# Patient Record
Sex: MALE | Race: WHITE | NOT HISPANIC OR LATINO | Employment: UNEMPLOYED | URBAN - METROPOLITAN AREA
[De-identification: names, ages, dates, MRNs, and addresses within clinical notes are randomized per-mention and may not be internally consistent; named-entity substitution may affect disease eponyms.]

---

## 2021-10-17 ENCOUNTER — APPOINTMENT (OUTPATIENT)
Dept: GENERAL RADIOLOGY | Facility: CLINIC | Age: 61
End: 2021-10-17
Attending: EMERGENCY MEDICINE
Payer: COMMERCIAL

## 2021-10-17 ENCOUNTER — HOSPITAL ENCOUNTER (OUTPATIENT)
Facility: CLINIC | Age: 61
Setting detail: OBSERVATION
Discharge: HOME | End: 2021-10-18
Attending: EMERGENCY MEDICINE | Admitting: INTERNAL MEDICINE
Payer: COMMERCIAL

## 2021-10-17 DIAGNOSIS — I20.0 UNSTABLE ANGINA (H): ICD-10-CM

## 2021-10-17 PROBLEM — R07.9 CHEST PAIN: Status: ACTIVE | Noted: 2021-10-17

## 2021-10-17 LAB
ALBUMIN SERPL-MCNC: 3.5 G/DL (ref 3.4–5)
ALP SERPL-CCNC: 90 U/L (ref 40–150)
ALT SERPL W P-5'-P-CCNC: 26 U/L (ref 0–70)
ANION GAP SERPL CALCULATED.3IONS-SCNC: 5 MMOL/L (ref 3–14)
APTT PPP: 32 SECONDS (ref 22–38)
AST SERPL W P-5'-P-CCNC: 21 U/L (ref 0–45)
BASOPHILS # BLD AUTO: 0.2 10E3/UL (ref 0–0.2)
BASOPHILS NFR BLD AUTO: 2 %
BILIRUB SERPL-MCNC: 0.6 MG/DL (ref 0.2–1.3)
BUN SERPL-MCNC: 19 MG/DL (ref 7–30)
CALCIUM SERPL-MCNC: 8.5 MG/DL (ref 8.5–10.1)
CHLORIDE BLD-SCNC: 107 MMOL/L (ref 94–109)
CO2 SERPL-SCNC: 27 MMOL/L (ref 20–32)
CREAT SERPL-MCNC: 0.99 MG/DL (ref 0.66–1.25)
EOSINOPHIL # BLD AUTO: 0.2 10E3/UL (ref 0–0.7)
EOSINOPHIL NFR BLD AUTO: 2 %
ERYTHROCYTE [DISTWIDTH] IN BLOOD BY AUTOMATED COUNT: 14.4 % (ref 10–15)
ERYTHROCYTE [DISTWIDTH] IN BLOOD BY AUTOMATED COUNT: 14.6 % (ref 10–15)
GFR SERPL CREATININE-BSD FRML MDRD: 83 ML/MIN/1.73M2
GLUCOSE BLD-MCNC: 97 MG/DL (ref 70–99)
HCT VFR BLD AUTO: 46.8 % (ref 40–53)
HCT VFR BLD AUTO: 49.5 % (ref 40–53)
HGB BLD-MCNC: 15.4 G/DL (ref 13.3–17.7)
HGB BLD-MCNC: 16.4 G/DL (ref 13.3–17.7)
HOLD SPECIMEN: NORMAL
IMM GRANULOCYTES # BLD: 0 10E3/UL
IMM GRANULOCYTES NFR BLD: 0 %
INR PPP: 1.05 (ref 0.85–1.15)
LYMPHOCYTES # BLD AUTO: 2.9 10E3/UL (ref 0.8–5.3)
LYMPHOCYTES NFR BLD AUTO: 30 %
MCH RBC QN AUTO: 29.3 PG (ref 26.5–33)
MCH RBC QN AUTO: 30.5 PG (ref 26.5–33)
MCHC RBC AUTO-ENTMCNC: 32.9 G/DL (ref 31.5–36.5)
MCHC RBC AUTO-ENTMCNC: 33.1 G/DL (ref 31.5–36.5)
MCV RBC AUTO: 89 FL (ref 78–100)
MCV RBC AUTO: 92 FL (ref 78–100)
MONOCYTES # BLD AUTO: 0.8 10E3/UL (ref 0–1.3)
MONOCYTES NFR BLD AUTO: 8 %
NEUTROPHILS # BLD AUTO: 5.8 10E3/UL (ref 1.6–8.3)
NEUTROPHILS NFR BLD AUTO: 58 %
NRBC # BLD AUTO: 0 10E3/UL
NRBC BLD AUTO-RTO: 0 /100
PLATELET # BLD AUTO: 553 10E3/UL (ref 150–450)
PLATELET # BLD AUTO: 568 10E3/UL (ref 150–450)
POTASSIUM BLD-SCNC: 4.2 MMOL/L (ref 3.4–5.3)
PROT SERPL-MCNC: 7.6 G/DL (ref 6.8–8.8)
RBC # BLD AUTO: 5.25 10E6/UL (ref 4.4–5.9)
RBC # BLD AUTO: 5.38 10E6/UL (ref 4.4–5.9)
SARS-COV-2 RNA RESP QL NAA+PROBE: NEGATIVE
SODIUM SERPL-SCNC: 139 MMOL/L (ref 133–144)
TROPONIN I SERPL-MCNC: <0.015 UG/L (ref 0–0.04)
WBC # BLD AUTO: 13.7 10E3/UL (ref 4–11)
WBC # BLD AUTO: 9.9 10E3/UL (ref 4–11)

## 2021-10-17 PROCEDURE — 85025 COMPLETE CBC W/AUTO DIFF WBC: CPT | Performed by: EMERGENCY MEDICINE

## 2021-10-17 PROCEDURE — 82040 ASSAY OF SERUM ALBUMIN: CPT | Performed by: EMERGENCY MEDICINE

## 2021-10-17 PROCEDURE — 96376 TX/PRO/DX INJ SAME DRUG ADON: CPT

## 2021-10-17 PROCEDURE — 36415 COLL VENOUS BLD VENIPUNCTURE: CPT | Performed by: EMERGENCY MEDICINE

## 2021-10-17 PROCEDURE — 96374 THER/PROPH/DIAG INJ IV PUSH: CPT

## 2021-10-17 PROCEDURE — 87635 SARS-COV-2 COVID-19 AMP PRB: CPT | Performed by: EMERGENCY MEDICINE

## 2021-10-17 PROCEDURE — 36415 COLL VENOUS BLD VENIPUNCTURE: CPT | Performed by: INTERNAL MEDICINE

## 2021-10-17 PROCEDURE — 250N000013 HC RX MED GY IP 250 OP 250 PS 637: Performed by: INTERNAL MEDICINE

## 2021-10-17 PROCEDURE — 250N000011 HC RX IP 250 OP 636: Performed by: EMERGENCY MEDICINE

## 2021-10-17 PROCEDURE — 71046 X-RAY EXAM CHEST 2 VIEWS: CPT

## 2021-10-17 PROCEDURE — 93005 ELECTROCARDIOGRAM TRACING: CPT

## 2021-10-17 PROCEDURE — C9803 HOPD COVID-19 SPEC COLLECT: HCPCS

## 2021-10-17 PROCEDURE — 84484 ASSAY OF TROPONIN QUANT: CPT | Performed by: EMERGENCY MEDICINE

## 2021-10-17 PROCEDURE — 84484 ASSAY OF TROPONIN QUANT: CPT | Performed by: INTERNAL MEDICINE

## 2021-10-17 PROCEDURE — 99285 EMERGENCY DEPT VISIT HI MDM: CPT | Mod: 25

## 2021-10-17 PROCEDURE — 250N000013 HC RX MED GY IP 250 OP 250 PS 637: Performed by: EMERGENCY MEDICINE

## 2021-10-17 PROCEDURE — 99220 PR INITIAL OBSERVATION CARE,LEVEL III: CPT | Performed by: INTERNAL MEDICINE

## 2021-10-17 PROCEDURE — 85610 PROTHROMBIN TIME: CPT | Performed by: EMERGENCY MEDICINE

## 2021-10-17 PROCEDURE — 85027 COMPLETE CBC AUTOMATED: CPT | Performed by: INTERNAL MEDICINE

## 2021-10-17 PROCEDURE — G0378 HOSPITAL OBSERVATION PER HR: HCPCS

## 2021-10-17 PROCEDURE — 85730 THROMBOPLASTIN TIME PARTIAL: CPT | Performed by: EMERGENCY MEDICINE

## 2021-10-17 RX ORDER — ACETAMINOPHEN 325 MG/1
975 TABLET ORAL EVERY 8 HOURS
Status: DISCONTINUED | OUTPATIENT
Start: 2021-10-17 | End: 2021-10-18 | Stop reason: HOSPADM

## 2021-10-17 RX ORDER — LOSARTAN POTASSIUM 50 MG/1
100 TABLET ORAL DAILY
Status: DISCONTINUED | OUTPATIENT
Start: 2021-10-17 | End: 2021-10-18 | Stop reason: HOSPADM

## 2021-10-17 RX ORDER — ONDANSETRON 2 MG/ML
4 INJECTION INTRAMUSCULAR; INTRAVENOUS EVERY 6 HOURS PRN
Status: DISCONTINUED | OUTPATIENT
Start: 2021-10-17 | End: 2021-10-18 | Stop reason: HOSPADM

## 2021-10-17 RX ORDER — AMOXICILLIN 250 MG
1 CAPSULE ORAL 2 TIMES DAILY PRN
Status: DISCONTINUED | OUTPATIENT
Start: 2021-10-17 | End: 2021-10-18 | Stop reason: HOSPADM

## 2021-10-17 RX ORDER — HEPARIN SODIUM 10000 [USP'U]/100ML
0-5000 INJECTION, SOLUTION INTRAVENOUS CONTINUOUS
Status: DISCONTINUED | OUTPATIENT
Start: 2021-10-17 | End: 2021-10-17

## 2021-10-17 RX ORDER — METOPROLOL TARTRATE 25 MG/1
25 TABLET, FILM COATED ORAL 2 TIMES DAILY
Status: DISCONTINUED | OUTPATIENT
Start: 2021-10-17 | End: 2021-10-18 | Stop reason: HOSPADM

## 2021-10-17 RX ORDER — AMOXICILLIN 250 MG
2 CAPSULE ORAL 2 TIMES DAILY PRN
Status: DISCONTINUED | OUTPATIENT
Start: 2021-10-17 | End: 2021-10-18 | Stop reason: HOSPADM

## 2021-10-17 RX ORDER — ONDANSETRON 4 MG/1
4 TABLET, ORALLY DISINTEGRATING ORAL EVERY 6 HOURS PRN
Status: DISCONTINUED | OUTPATIENT
Start: 2021-10-17 | End: 2021-10-18 | Stop reason: HOSPADM

## 2021-10-17 RX ORDER — ASPIRIN 81 MG/1
324 TABLET, CHEWABLE ORAL ONCE
Status: COMPLETED | OUTPATIENT
Start: 2021-10-17 | End: 2021-10-17

## 2021-10-17 RX ORDER — ASPIRIN 325 MG
325 TABLET ORAL DAILY
Status: DISCONTINUED | OUTPATIENT
Start: 2021-10-18 | End: 2021-10-18 | Stop reason: HOSPADM

## 2021-10-17 RX ORDER — LOSARTAN POTASSIUM 100 MG/1
100 TABLET ORAL DAILY
COMMUNITY

## 2021-10-17 RX ORDER — LEVOTHYROXINE SODIUM 112 UG/1
112 TABLET ORAL DAILY
COMMUNITY

## 2021-10-17 RX ORDER — LEVOTHYROXINE SODIUM 112 UG/1
112 TABLET ORAL DAILY
Status: DISCONTINUED | OUTPATIENT
Start: 2021-10-17 | End: 2021-10-18 | Stop reason: HOSPADM

## 2021-10-17 RX ORDER — METOPROLOL TARTRATE 25 MG/1
25 TABLET, FILM COATED ORAL 2 TIMES DAILY
COMMUNITY

## 2021-10-17 RX ORDER — LIDOCAINE 40 MG/G
CREAM TOPICAL
Status: DISCONTINUED | OUTPATIENT
Start: 2021-10-17 | End: 2021-10-18 | Stop reason: HOSPADM

## 2021-10-17 RX ADMIN — LOSARTAN POTASSIUM 100 MG: 50 TABLET, FILM COATED ORAL at 16:02

## 2021-10-17 RX ADMIN — HEPARIN SODIUM 1200 UNITS/HR: 10000 INJECTION, SOLUTION INTRAVENOUS at 12:40

## 2021-10-17 RX ADMIN — ACETAMINOPHEN 975 MG: 325 TABLET, FILM COATED ORAL at 16:02

## 2021-10-17 RX ADMIN — LEVOTHYROXINE SODIUM 112 MCG: 0.11 TABLET ORAL at 16:02

## 2021-10-17 RX ADMIN — ASPIRIN 81 MG CHEWABLE TABLET 324 MG: 81 TABLET CHEWABLE at 12:37

## 2021-10-17 ASSESSMENT — ACTIVITIES OF DAILY LIVING (ADL): ADLS_ACUITY_SCORE: 12

## 2021-10-17 ASSESSMENT — MIFFLIN-ST. JEOR: SCORE: 1936.64

## 2021-10-17 NOTE — PHARMACY-ADMISSION MEDICATION HISTORY
Admission medication history interview status for this patient is complete. See [facility] admission navigator for allergy information, prior to admission medications and immunization status.     Medication history interview done, indicate source(s): Patient  Medication history resources (including written lists, pill bottles, clinic record): [facility]  Pharmacy: [facility] off of [facility]    Changes made to PTA medication list:  Added: All medications listed below were added  Changed: None  Reported as Not Taking: None  Removed: None    Actions taken by pharmacist (provider contacted, etc):None     Additional medication history information:None    Medication reconciliation/reorder completed by provider prior to medication history?  No     Prior to Admission medications    Medication Sig Last Dose Taking? Auth Provider   levothyroxine (SYNTHROID/LEVOTHROID) 112 MCG tablet Take 112 mcg by mouth daily 10/16/2021 at Unknown time Yes Unknown, Entered By History   losartan (COZAAR) 100 MG tablet Take 100 mg by mouth daily 10/16/2021 at Unknown time Yes Unknown, Entered By History   metoprolol tartrate (LOPRESSOR) 25 MG tablet Take 25 mg by mouth 2 times daily 10/16/2021 at Unknown time Yes Unknown, Entered By History

## 2021-10-17 NOTE — ED TRIAGE NOTES
Pt arrives to the ED due to left sided chest pain that has been present since yesterday morning while mowing grass. Pt states the pain has been intermittent but that he has had 10 episodes since and each time he feels dizzy/lightheaded and feels like he will pass out. Denies SOB or vomiting.

## 2021-10-17 NOTE — H&P
See dictated H&P    Second troponin undetectable.  Will stop heparin gtt (started in ER).      Admit to observation     Plan on stress echo in AM

## 2021-10-17 NOTE — H&P
Admitted: 10/17/2021    CHIEF COMPLAINT:  Chest pressure.    HISTORY OF PRESENT ILLNESS:  Mr. Prinsen and is a 61-year-old male with a history of hypertension, hyperlipidemia, hypothyroidism and irregular heartbeat.  He presented to the hospital today for concerns about chest pressure.  He developed chest pressure yesterday.  He has had intermittent chest pressure for the past 24 hours.  When I asked him where his chest pressure is located, he points to the left side of his chest near his left nipple line.  He reports the pain is localized and does not radiate to the back, arm, neck or jaw.  He has no associated shortness of breath, nausea or palpitations.  Beyond chest pressure, he has also been feeling quite a faint, feeling like he has almost passed out several times.  The patient has never had symptoms like this in the past.  His chest pressure symptoms started yesterday while using a push lawnmower.  Since that time, he has had at least 20-30 episodes of recurrent chest pressure.  Episodes last for about 20-30 seconds and then spontaneously resolve.  They are happening with activity and at rest.  He had several such occurrences in the Emergency Department since arriving to [facility].    On arrival to the ER today, vital signs included blood pressure 160/115 with a heart rate of 80.  No fever.  Saturation 97% on room air.  Workup in the ER included labs and imaging.  EKG was performed, which I personally reviewed and shows a sinus rhythm with no acute ST changes or pathologic Q-waves.  EKG appears normal.  CBC is unremarkable.  Complete metabolic panel is normal.  Troponin is undetectable.  COVID-19 testing is negative.  I have ordered a second troponin level, which has been collected and is pending.  Chest x-ray showed no acute findings.    I spoke with Dr. Guido of the Emergency Department.  In the ER, the patient was started on a heparin drip for concerns about possible acute coronary syndrome.  He is being  admitted to the hospitalist service.    PAST MEDICAL HISTORY:     1.  Hypertension.  2.  Hyperlipidemia.  3.  Hypothyroidism.  4.  History of irregular heartbeat, further details unclear.    CURRENT MEDICATIONS:     1.  Levothyroxine.  2.  Losartan.  3.  Metoprolol.    ALLERGIES:  NONE.    FAMILY HISTORY:  Reviewed.  Nothing contributory to this admission.  Specifically, no known heart disease in the family.    SOCIAL HISTORY:  Has never been a smoker of cigarettes.  He does chew tobacco.  He does not drink any significant alcohol.  Spouse is present with him at bedside.    REVIEW OF SYSTEMS:  See HPI for details.  Comprehensive greater than 10-point review of systems is otherwise negative besides that detailed above.    PHYSICAL EXAMINATION:    VITAL SIGNS:  Blood pressure is currently 155/83 with a heart rate of 68.  No fever.  Saturation 96% on room air.  GENERAL:  The patient appears nontoxic.  He does not appear to be in any distress.  His spouse is present at bedside.  He is a good historian.  HEENT:  Head is atraumatic.  Sclerae white.  Eyelids normal.  Conjunctivae normal.  Extraocular movements are intact.  NECK:  Supple.  No cervical or supraclavicular lymphadenopathy.  CARDIOVASCULAR:  Heart is regular rate and rhythm.  No significant murmurs.  No lower extremity edema.  LUNGS:  Clear to auscultation bilaterally.  No intercostal retractions.  No conversational dyspnea.  ABDOMEN:  Nontender, soft, no masses, no organomegaly.  EXTREMITIES:  Show no edema.  SKIN:  Reveals no rashes.  No jaundice.  Skin is dry to touch.  NEUROLOGIC:  Cranial nerves II through XII are intact.  Moves all extremities appropriately.  Sensation intact to light touch in the upper and lower extremities bilaterally.  PSYCHIATRIC:  The patient is awake, alert and oriented x3.  Mood and affect are normal and appropriate.  MUSCULOSKELETAL:  Patient has no tenderness with palpation to the chest wall.  VASCULAR:  Patient a radial artery  pulses appear to be equal in bilateral upper extremities.    LABORATORY AND IMAGING DATA:  Reviewed above in HPI.  EKG personally reviewed as above.    IMPRESSION AND PLAN:  Mr. Khanna and is a 61-year-old male with a history of hypertension, hypothyroidism, hyperlipidemia.  He presents to the hospital today with chest pressure.  He reports that he has had multiple episodes of chest pressure over the past 24 hours.  They are occurring with activity and at rest.  Symptoms lasted for about 20-30 seconds and then spontaneously resolved.  Associated symptoms include lightheadedness and feelings of near syncope.    Objectively, there was no evidence of cardiac ischemia currently.  He has a normal EKG.  He has a normal troponin level after having symptoms for the past 24 hours.    1.  Chest pressure colon symptoms sound atypical.  Symptoms last only 20-30 seconds and resolve.  Symptoms are located in the area of left breast do not radiate elsewhere.  EKG normal and troponin enzyme negative.  2.  Hypertension history:  Resume home medications when clarified by Pharmacy.  Would hold beta blocker for now, given plans for likely exercise stress test tomorrow.  3.  Hyperlipidemia:  Currently untreated.  We will check lipid panel in the morning.  4.  Hypothyroidism:  Will check TSH.  Continue home dose of levothyroxine.    The patient currently on a heparin drip, which was started in the Emergency Department.  If his next troponin level is again undetectable and not rising, I will stop the heparin drip.  At this point, observation status appears to be appropriate.  We will order a stress echocardiogram for tomorrow.  If, however, troponins become elevated, would cancel stress echocardiogram and involve Cardiology.    Colin Carreon MD        D: 10/17/2021   T: 10/17/2021   MT: [id number]    Name:     ASHLEE KHANNA  MRN:      [id number]        Account:     [id number]   :      1960           Admitted:    10/17/2021        Document: [id number]

## 2021-10-17 NOTE — ED PROVIDER NOTES
History     Chief Complaint:  Chest Pain       HPI   Henry Khanna is a 61 year old male who presents with stuttering chest pain.  The patient states that yesterday when he was mowing he began to experience left-sided chest discomfort that was associated with simultaneous lightheadedness.  He has had approximately 12 episodes of the symptoms since however they are not necessarily always exertional.  He has no exacerbating or alleviating identified factors.  He does not have any shortness of breath or palpitations with them.  Denies, prior to yesterday, ever having similar symptoms.  He has not taken any aspirin today.    Allergies:  No Known Allergies     Medications:    Metoprolol  Levothyroxine  Losartan    Past Medical History:    Hypertension  Hypothyroidism,  High cholesterol    Past Surgical History:    Splenectomy  Tonsillectomy    Family History:    family history is not on file.    Social History:   Arrives by private car    PCP: No primary care provider on file.     Review of Systems  Positive-chest pain, lightheadedness  Negative-shortness of breath, palpitations  Remaining pertinent 10 point ROS negative    Physical Exam     Patient Vitals for the past 24 hrs:   BP Temp Pulse Resp SpO2 Weight   10/17/21 1330 -- -- 62 25 95 % --   10/17/21 1315 124/81 -- 60 23 95 % --   10/17/21 1300 (!) 132/99 -- 63 13 96 % --   10/17/21 1200 (!) 149/92 -- 73 -- 96 % --   10/17/21 1137 (!) 160/115 98.6  F (37  C) 78 20 97 % 108.8 kg (239 lb 13.8 oz)        Physical Exam  General/Appearance: appears stated age, well-groomed, appears comfortable  Eyes: EOMI, no scleral injection, no icterus  ENT: MMM  Neck: supple, nl ROM, no stiffness  Cardiovascular: RRR, nl S1S2, no m/r/g, 2+ pulses in all 4 extremities, cap refill <2sec  Respiratory: CTAB, good air movement throughout, no wheezes/rhonchi/rales, no increased WOB, no retractions  GI: abd soft, non-distended, nttp,  no HSM, no rebound, no guarding, nl BS  MSK: ALMANZAR, good  tone, no bony abnormality  Skin: warm and well-perfused, no rash, no edema, no ecchymosis, nl turgor  Neuro: GCS 15, alert and oriented, no gross focal neuro deficits  Psych: interacts appropriately  Heme: no petechia, no purpura, no active bleeding      Emergency Department Course     ECG (11:33:58):  NSR  Rate 82 bpm.   QT/QTc 450.   P-R-T axes 49 16 29.   Interpreted at 1205 by Malathi Guido.     Imaging:  XR Chest 2 Views   Final Result   IMPRESSION: Negative chest.         All imaging results were discussed with the pt and his wife who voiced understanding of the findings.    Laboratory:  BMP-139, 4.2, 107, 27, 19, 0.99, 83  Glucose 97  Total protein 7.6  Total bilirubin 0.6  Alk phos 90  ALT 26  AST 21  CBC-9.9, 16.4, 49.5, 568  Troponin less than 0.015  INR 1.05  PTT 32    Interventions:   mg  Heparin bolus and gtt    Emergency Department Course:  Past medical records, nursing notes, and vitals reviewed.  I performed an exam of the patient and obtained history, as documented above.  ED Course as of Oct 17 1338   Sun Oct 17, 2021   1315 Updated on results      1327 Spoke with Dr Carreon          Impression & Plan        Medical Decision Making:  This pt presents with chest pain of unclear etiology, however, I have concern that it represent a cardiac etiology.  Their EKG, CXR, and trop have been unremarkable however I do feel the warrant admission to further workup for unstable angina with further EKGs, trops, and provocative testing. There are no focal infiltrates, effusions, pulm edema, or evidence of PTX on CXR. The pt has not had recent fevers or viral infections to suggest pericarditis.  They are at low risk for aortic pathology and have nl aortic contour on CXR.  They have not had recent chest trauma.  All of this was discussed with the pt and they have agreed to come in for further work-up.  So far they have received ASA and heparin gtt -- they currently are pain free.  At the time of  their transfer out of the ED they are HD stable.    Diagnosis:    ICD-10-CM    1. Unstable angina (H)  I20.0         Discharge Medications:  New Prescriptions    No medications on file        10/17/2021   Malathi Guido*        Malathi Guido MD  10/17/21 5424

## 2021-10-17 NOTE — ED NOTES
[facility]  ED Nurse Handoff Report    Henry Khanna is a 61 year old male   ED Chief complaint: Chest Pain  . ED Diagnosis:   Final diagnoses:   Unstable angina (H)     Allergies: No Known Allergies    Code Status: Full Code  Activity level - Baseline/Home:  Independent. Activity Level - Current:   Independent. Lift room needed: No. Bariatric: No   Needed: No   Isolation: No. Infection: Not Applicable.     Vital Signs:   Vitals:    10/17/21 1200 10/17/21 1300 10/17/21 1315 10/17/21 1330   BP: (!) 149/92 (!) 132/99 124/81    Pulse: 73 63 60 62   Resp:  13 23 25   Temp:       SpO2: 96% 96% 95% 95%   Weight:           Cardiac Rhythm:  ,    SR  Pain level:    Patient confused: No. Patient Falls Risk: No.   Elimination Status: has not voided yet   Patient Report - Initial Complaint:   Intermittent left-sided chest discomfort with lightheadedness. About 12 episodes since yesterday. Focused Assessment: Currently pain free  Tests Performed: imaging and labs. Abnormal Results:  Labs Ordered and Resulted from Time of ED Arrival Up to the Time of Departure from the ED   CBC WITH PLATELETS AND DIFFERENTIAL - Abnormal; Notable for the following components:       Result Value    Platelet Count 568 (*)     All other components within normal limits   COMPREHENSIVE METABOLIC PANEL - Normal   INR - Normal   PARTIAL THROMBOPLASTIN TIME - Normal   TROPONIN I - Normal   EXTRA RED TOP TUBE   EXTRA GREEN TOP (LITHIUM HEPARIN) TUBE   EXTRA PURPLE TOP TUBE   EXTRA BLUE TOP TUBE   COVID-19 VIRUS (CORONAVIRUS) BY PCR   CARDIAC CONTINUOUS MONITORING   PERIPHERAL IV CATHETER   MEASURE WEIGHT   NOTIFY PHYSICIAN   NOTIFY PHYSICIAN   EXTRA TUBE    Narrative:     The following orders were created for panel order Extra Tube ([place] Draw).  Procedure                               Abnormality         Status                     ---------                               -----------         ------                     Extra Red  Top Tube[[id number]]                               Final result               Extra Green Top (Lithium...[[id number]]                      Final result               Extra Purple Top Tube[[id number]]                            Final result                 Please view results for these tests on the individual orders.   EXTRA TUBE    Narrative:     The following orders were created for panel order Extra Tube ([place] Draw).  Procedure                               Abnormality         Status                     ---------                               -----------         ------                     Extra Blue Top Tube[[id number]]                              Final result                 Please view results for these tests on the individual orders.   CBC WITH PLATELETS & DIFFERENTIAL    Narrative:     The following orders were created for panel order CBC with platelets differential.  Procedure                               Abnormality         Status                     ---------                               -----------         ------                     CBC with platelets and d...[[id number]]  Abnormal            Final result                 Please view results for these tests on the individual orders.     OBS brochure/video discussed/provided to patient:  Yes  ED Medications:   Medications   heparin 25,000 units in 0.45% NaCl 250 mL ANTICOAGULANT infusion (1,200 Units/hr Intravenous New Bag 10/17/21 1240)   heparin ANTICOAGULANT loading dose for  LOW INTENSITY TREATMENT* Give BEFORE starting heparin infusion (6,000 Units Intravenous Given 10/17/21 1238)   aspirin (ASA) chewable tablet 324 mg (324 mg Oral Given 10/17/21 1237)     Drips infusing:  Yes, heparin  For the majority of the shift, the patient's behavior Green.     Sepsis treatment initiated: No     Patient tested for COVID 19 prior to admission: YES    ED Nurse Name/Phone Number: Monika Howard RN,   1:50 PM   RECEIVING UNIT ED HANDOFF REVIEW    Above ED Nurse  Handoff Report was reviewed: Yes  Reviewed by: Mary Jo Severance, RN on October 17, 2021 at 2:00 PM

## 2021-10-18 ENCOUNTER — APPOINTMENT (OUTPATIENT)
Dept: CARDIOLOGY | Facility: CLINIC | Age: 61
End: 2021-10-18
Attending: INTERNAL MEDICINE
Payer: COMMERCIAL

## 2021-10-18 VITALS
HEART RATE: 82 BPM | DIASTOLIC BLOOD PRESSURE: 66 MMHG | SYSTOLIC BLOOD PRESSURE: 115 MMHG | OXYGEN SATURATION: 93 % | WEIGHT: 238.9 LBS | TEMPERATURE: 99 F | RESPIRATION RATE: 18 BRPM | HEIGHT: 72 IN | BODY MASS INDEX: 32.36 KG/M2

## 2021-10-18 LAB
ATRIAL RATE - MUSE: 82 BPM
CHOLEST SERPL-MCNC: 195 MG/DL
DIASTOLIC BLOOD PRESSURE - MUSE: NORMAL MMHG
FASTING STATUS PATIENT QL REPORTED: ABNORMAL
HDLC SERPL-MCNC: 48 MG/DL
INTERPRETATION ECG - MUSE: NORMAL
LDLC SERPL CALC-MCNC: 126 MG/DL
NONHDLC SERPL-MCNC: 147 MG/DL
P AXIS - MUSE: 49 DEGREES
PR INTERVAL - MUSE: 164 MS
QRS DURATION - MUSE: 84 MS
QT - MUSE: 386 MS
QTC - MUSE: 450 MS
R AXIS - MUSE: 16 DEGREES
SYSTOLIC BLOOD PRESSURE - MUSE: NORMAL MMHG
T AXIS - MUSE: 29 DEGREES
TRIGL SERPL-MCNC: 105 MG/DL
TSH SERPL DL<=0.005 MIU/L-ACNC: 2.25 MU/L (ref 0.4–4)
VENTRICULAR RATE- MUSE: 82 BPM

## 2021-10-18 PROCEDURE — 93321 DOPPLER ECHO F-UP/LMTD STD: CPT | Mod: 26 | Performed by: INTERNAL MEDICINE

## 2021-10-18 PROCEDURE — 82465 ASSAY BLD/SERUM CHOLESTEROL: CPT | Performed by: INTERNAL MEDICINE

## 2021-10-18 PROCEDURE — 36415 COLL VENOUS BLD VENIPUNCTURE: CPT | Performed by: INTERNAL MEDICINE

## 2021-10-18 PROCEDURE — 93018 CV STRESS TEST I&R ONLY: CPT | Performed by: INTERNAL MEDICINE

## 2021-10-18 PROCEDURE — 99217 PR OBSERVATION CARE DISCHARGE: CPT | Performed by: INTERNAL MEDICINE

## 2021-10-18 PROCEDURE — 84443 ASSAY THYROID STIM HORMONE: CPT | Performed by: INTERNAL MEDICINE

## 2021-10-18 PROCEDURE — 255N000002 HC RX 255 OP 636: Performed by: INTERNAL MEDICINE

## 2021-10-18 PROCEDURE — 93350 STRESS TTE ONLY: CPT | Mod: 26 | Performed by: INTERNAL MEDICINE

## 2021-10-18 PROCEDURE — 250N000013 HC RX MED GY IP 250 OP 250 PS 637: Performed by: INTERNAL MEDICINE

## 2021-10-18 PROCEDURE — 93325 DOPPLER ECHO COLOR FLOW MAPG: CPT | Mod: 26 | Performed by: INTERNAL MEDICINE

## 2021-10-18 PROCEDURE — G0378 HOSPITAL OBSERVATION PER HR: HCPCS

## 2021-10-18 PROCEDURE — 999N000208 ECHO STRESS ECHOCARDIOGRAM

## 2021-10-18 PROCEDURE — C8928 TTE W OR W/O FOL W/CON,STRES: HCPCS

## 2021-10-18 RX ADMIN — ASPIRIN 325 MG ORAL TABLET 325 MG: 325 PILL ORAL at 08:07

## 2021-10-18 RX ADMIN — ACETAMINOPHEN 975 MG: 325 TABLET, FILM COATED ORAL at 00:02

## 2021-10-18 RX ADMIN — LOSARTAN POTASSIUM 100 MG: 50 TABLET, FILM COATED ORAL at 08:07

## 2021-10-18 RX ADMIN — HUMAN ALBUMIN MICROSPHERES AND PERFLUTREN 4 ML: 10; .22 INJECTION, SOLUTION INTRAVENOUS at 07:29

## 2021-10-18 RX ADMIN — LEVOTHYROXINE SODIUM 112 MCG: 0.11 TABLET ORAL at 08:07

## 2021-10-18 NOTE — PROGRESS NOTES
PRIMARY DIAGNOSIS: CHEST PAIN  OUTPATIENT/OBSERVATION GOALS TO BE MET BEFORE DISCHARGE:  1. Ruled out acute coronary syndrome (negative or stable Troponin):  Yes  2. Pain Status: Pain free.  3. Appropriate provocative testing performed: Yes  - Stress Test Procedure: Stress echo normal.  - Interpretation of cardiac rhythm per telemetry tech: SB/SR   4. Cleared by Consultants (if applicable):N/A  5. Return to near baseline physical activity: Yes     Discharge Planner Nurse   Safe discharge environment identified: Yes  Barriers to discharge: No       Entered by: Jenny Radford RN  Tele SB/SR. Pt A/O x 4, able to make needs known. Up independent in room. Denies pain, CP, SOB. On scheduled tylenol. PIV in left arm SL, flushed w/o difficulty. Trops negative x3. Stess echo this a.m.

## 2021-10-18 NOTE — PLAN OF CARE
PRIMARY DIAGNOSIS: CHEST PAIN  OUTPATIENT/OBSERVATION GOALS TO BE MET BEFORE DISCHARGE:  1. Ruled out acute coronary syndrome (negative or stable Troponin):  Yes  2. Pain Status: Pain free.  3. Appropriate provocative testing performed: Yes  - Stress Test Procedure: scheduled tomorrow  - Interpretation of cardiac rhythm per telemetry tech:     4. Cleared by Consultants (if applicable):N/A  5. Return to near baseline physical activity: Yes  Discharge Planner Nurse   Safe discharge environment identified: Yes  Barriers to discharge: Yes       Entered by: BRDAY JOHNSON 10/17/2021 7:03 PM     Please review provider order for any additional goals.   Nurse to notify provider when observation goals have been met and patient is ready for discharge.

## 2021-10-18 NOTE — PLAN OF CARE
PRIMARY DIAGNOSIS: CHEST PAIN  OUTPATIENT/OBSERVATION GOALS TO BE MET BEFORE DISCHARGE:  1. Ruled out acute coronary syndrome (negative or stable Troponin):  Yes  2. Pain Status: Pain free.  3. Appropriate provocative testing performed: Yes  - Stress Test Procedure: Stress test scheduled this AM  - Interpretation of cardiac rhythm per telemetry tech: SB/SR    4. Cleared by Consultants (if applicable):N/A  5. Return to near baseline physical activity: Yes  Discharge Planner Nurse   Safe discharge environment identified: Yes  Barriers to discharge: Yes, further testing required       Entered by: Saira Pearce 10/18/2021 1:48 AM     Please review provider order for any additional goals.   Nurse to notify provider when observation goals have been met and patient is ready for discharge.

## 2021-10-18 NOTE — DISCHARGE SUMMARY
[facility]  Discharge Summary  Name: Henry Khanna    MRN: [id number]  YOB: 1960    Age: 61 year old  Date of Discharge:  10/18/2021  1:33 PM  Date of Admission: 10/17/2021  Primary Care Provider: Teresa Chaudhary  Discharge Physician:  Derick Leal DO  Discharging Service:  Hospitalist      Discharge Diagnosis:  Chest pain, musculoskeletal pain.  HTN       Other Diagnosis:  HLP  Hypothyroidism.     Discharge Disposition:  Discharged to home     Allergies:  No Known Allergies     Discharge Medications:   Discharge Medication List as of 10/18/2021  1:07 PM      CONTINUE these medications which have NOT CHANGED    Details   levothyroxine (SYNTHROID/LEVOTHROID) 112 MCG tablet Take 112 mcg by mouth daily, Historical      losartan (COZAAR) 100 MG tablet Take 100 mg by mouth daily, Historical      metoprolol tartrate (LOPRESSOR) 25 MG tablet Take 25 mg by mouth 2 times daily, Historical              Condition on Discharge:  Discharge condition: Fair   Discharge vitals: Blood pressure 115/66, pulse 82, temperature 99  F (37.2  C), temperature source Oral, resp. rate 18, height 1.829 m (6'), weight 108.4 kg (238 lb 14.4 oz), SpO2 93 %.   Code status on discharge: Full Code     History of Illness:  See detailed admission note for full details.    Significant Physical Exam Findings:  GENERAL:  AOX3.  Not in any distress.    HEENT:  Pink, unicteric, moist mucosa.   NECK:  Supple.  No cervical or supraclavicular lymphadenopathy.  CVS: S! And S2 well heard. no murmurs.  No lower extremity edema.  CHEST:  Clear to auscultation bilaterally, no wheezing.  ABDOMEN:  Nontender, soft, no masses, no organomegaly.  EXT:  Now edema.   SKIN:  Reveals no rashes.  No jaundice.  Skin is dry to touch.  NEUROLOGIC:  CN II-XI grossly intact.  PSYCH:  Calm, cooperative,  alert and oriented x3    Procedures other than Imaging:  None     Imaging:  Results for orders placed or performed during the hospital encounter  of 10/17/21   XR Chest 2 Views    Narrative    EXAM: XR CHEST 2 VW  LOCATION: [facility]  DATE/TIME: 10/17/2021 12:23 PM    INDICATION: CP  COMPARISON: None.      Impression    IMPRESSION: Negative chest.   Echo Stress Echocardiogram    Narrative    [id number]  [id number]  [id number]  [id number]^LORI^AUDELIA^BRISEIDA     [facility]  Echocardiography Laboratory  [address] [place] [address]     Name: ASHLEE ALCARAZ  MRN: [id number]  : 1960  Study Date: 10/18/2021 07:13 AM  Age: 61 yrs  Gender: Male  Patient Location: [facility]  Reason For Study: Chest Pain  History: Hypertension  Ordering Physician: AUDELIA SANDOVAL  Referring Physician: jeffery  Performed By: NESSA     BSA: 2.3 m2  Height: 72 in  Weight: 235 lb  HR: 61  BP: 126/82 mmHg  Medications: metoprolol,LOSARTAN  ______________________________________________________________________________  Procedure  Stress Echo Complete. Contrast Optison.  ______________________________________________________________________________  Interpretation Summary  The Duke treadmill score was low risk ( >5 Duke score).  This was a normal stress echocardiogram with no evidence of stress-induced  ischemia.  There was a transient episode of sinus bradycardia 3 minutes into recovery  that spontaneously resolved.  Left ventricular systolic function is normal.  The right ventricle is normal in size and function.  ______________________________________________________________________________  Stress  This study was stopped as the patient achieved an adequate exercise effort for  a diagnostic study.  The patient did not exhibit any symptoms during exercise.  There was a normal BP response to exercise.  RPP 33721.  Target Heart Rate was achieved.  The Duke treadmill score was low risk ( >5 Duke score).  There were no changes observed with stress.  This was a normal stress echocardiogram with no evidence of stress-induced  ischemia.  Normal  left ventricular function and wall motion at rest and post-stress.     Baseline  The patient is in normal sinus rhythm.  Normal left ventricular function and wall motion at rest and post-stress.     Stress Results             Protocol:  Donnie          Maximum Predicted HR:   161 bpm             Target HR: 137 bpm        % Maximum Predicted HR: 93 %              Duration  Heart    Stage   (mm:ss)  Rate    BP                      Comment                     (bpm)   Stage 1   3:00     102  138/82   Stage 2   3:00     122  152/82   Stage 3   3:00     150  176/86  RecoveryR  6:00     86   112/76At 3:30 revovery, 6 seconds of symptomatic                                 bradycardia ~30 bpm            Stress Duration:   9:00 mm:ss *        Recovery Time: 6:00 mm:ss         Maximum Stress HR: 150 bpm *           METS:          10     Left Ventricle  The left ventricle is normal in size. Left ventricular systolic function is  normal.     Right Ventricle  The right ventricle is normal in size and function.     Aortic Valve  The aortic valve is normal in structure and function.     ______________________________________________________________________________  Doppler Measurements & Calculations  TR max solomon: 195.0 cm/sec  TR max PG: 15.2 mmHg     ______________________________________________________________________________  Report approved by: Abner Serna 10/18/2021 08:56 AM                Consultations:  No consultations were requested during this admission.     Recent Lab Results:  Recent Labs   Lab 10/17/21  1530 10/17/21  1203   WBC 13.7* 9.9   HGB 15.4 16.4   HCT 46.8 49.5   MCV 89 92   * 568*     Recent Labs   Lab 10/17/21  1203      POTASSIUM 4.2   CHLORIDE 107   CO2 27   ANIONGAP 5   GLC 97   BUN 19   CR 0.99   GFRESTIMATED 83   PATI 8.5          Pending Results:    Unresulted Labs Ordered in the Past 30 Days of this Admission     No orders found for last 31 day(s).              Reason for your  hospital stay    Chest pain     Follow-up and recommended labs and tests     Follow up with primary care provider, Teresa Chaudhary, within 7 days for hospital follow- up.     Activity    Your activity upon discharge: activity as tolerated     Diet    Follow this diet upon discharge: Orders Placed This Encounter      Regular Diet Adult       Hospital Course:  Mr. Khanna and is a 61-year-old male with a history of hypertension, hypothyroidism, hyperlipidemia.  He presents to the hospital today with chest pressure.  He reports that he has had multiple episodes of chest pressure over the past 24 hours.  They are occurring with activity and at rest.  Symptoms lasted for about 20-30 seconds and then spontaneously resolved.  Associated symptoms include lightheadedness and feelings of near syncope.     Objectively, there was no evidence of cardiac ischemia currently.  He has a normal EKG.  He has a normal troponin level after having symptoms for the past 24 hours.     1.  Chest pressure: it sounded atypical.  Symptoms last only 20-30 seconds and resolve.  Symptoms are located in the area of left breast do not radiate elsewhere.  EKG normal and troponin enzyme negative. Stress Echo is normal.  2.  Hypertension:  He will be on his PTA medications.  3.  Hyperlipidemia:  Currently untreated.  Lipid profile checked. He will discuss with his PCP and follow up.  4.  Hypothyroidism:  Will check TSH.  Continue home dose of levothyroxine.     Total time spent in face to face contact with the patient and coordinating discharge was:  25 Minutes.

## 2021-10-18 NOTE — PROGRESS NOTES
"PRIMARY DIAGNOSIS: CHEST PAIN  OUTPATIENT/OBSERVATION GOALS TO BE MET BEFORE DISCHARGE:  1. Ruled out acute coronary syndrome (negative or stable Troponin):  Yes  2. Pain Status: Pain free.  3. Appropriate provocative testing performed: Yes  - Stress Test Procedure: Stress echo normal.  - Interpretation of cardiac rhythm per telemetry tech: SB/SR   4. Cleared by Consultants (if applicable):N/A  5. Return to near baseline physical activity: Yes    Discharge Planner Nurse   Safe discharge environment identified: Yes  Barriers to discharge: No       Entered by: Saira Pearce 10/18/2021 3:51 AM  VSS on RA. Tele SB/SR. Pt A/O x 4, able to make needs known. Up independent in room. Denies pain, CP, SOB. On scheduled tylenol. PIV in left arm SL, flushed w/o difficulty. Trops negative x3. Stess echo this a.m. normal.     At 1000 the patient reported to RN writer that he had another \"flushing event\". He describes the sensation as feeling flush and has numbness from toes up to trunk/upper extremities. He stated that he feels slight numbness for a few seconds and then the sensation goes away. He stated that he had up to 30 of these events yesterday prior to admission but has had \"a lot less\" events today. VSS. Addendum: Dr. Andrade notified and revisited pt in room.     Will continue with plan of care.   Please review provider order for any additional goals.   Nurse to notify provider when observation goals have been met and patient is ready for discharge.  "

## 2021-10-18 NOTE — PLAN OF CARE
PRIMARY DIAGNOSIS: CHEST PAIN  OUTPATIENT/OBSERVATION GOALS TO BE MET BEFORE DISCHARGE:  1. Ruled out acute coronary syndrome (negative or stable Troponin):  Yes  2. Pain Status: Pain free.  3. Appropriate provocative testing performed: Yes  - Stress Test Procedure: Tomorrow AM  - Interpretation of cardiac rhythm per telemetry tech: SR    4. Cleared by Consultants (if applicable):N/A  5. Return to near baseline physical activity: Yes  Discharge Planner Nurse   Safe discharge environment identified: Yes  Barriers to discharge: Yes       Entered by: BRADY JOHNSON 10/17/2021 9:08 PM      VSS, scheduled Tylenol for c/o Headache, eligio c/o minimal chest pressure and went away, Tele SR, up independently, Stress test tomorrow, NPO at midnight, will continue with POC     Please review provider order for any additional goals.   Nurse to notify provider when observation goals have been met and patient is ready for discharge.

## 2021-10-18 NOTE — PROGRESS NOTES
"PRIMARY DIAGNOSIS: CHEST PAIN  OUTPATIENT/OBSERVATION GOALS TO BE MET BEFORE DISCHARGE:  1. Ruled out acute coronary syndrome (negative or stable Troponin):  Yes  2. Pain Status: Pain free.  3. Appropriate provocative testing performed: Yes  - Stress Test Procedure: Stress echo normal.  - Interpretation of cardiac rhythm per telemetry tech: SB/SR   4. Cleared by Consultants (if applicable):N/A  5. Return to near baseline physical activity: Yes    Discharge Planner Nurse   Safe discharge environment identified: Yes  Barriers to discharge: No       Entered by:Jenny Radford RN  VSS on RA. Tele SB/SR. Pt A/O x 4, able to make needs known. Up independent in room. Denies pain, CP, SOB. On scheduled tylenol. PIV in left arm SL, flushed w/o difficulty. Trops negative x3. Stess echo this a.m. normal.     At 1000 the patient reported to RN writer that he had another \"flushing event\". He describes the sensation as feeling flush and has numbness from toes up to trunk/upper extremities. He stated that he feels slight numbness for a few seconds and then the sensation goes away. He stated that he had up to 30 of these events yesterday prior to admission but has had \"a lot less\" events today. VSS. Addendum: Dr. Andrade notified and revisited pt in room.     Will continue with plan of care.   Please review provider order for any additional goals.   Nurse to notify provider when observation goals have been met and patient is ready for discharge.    Addendum 1303: Pt has ambulated in halls several times w/o difficulty. Pt stated that he feels much better and agrees with discharging home. Pt spouse Camryn Streeter present and agrees as well.       "

## 2021-10-18 NOTE — PROGRESS NOTES
Obs out 1333    Discharge instructions provided to the pt & spouse. No new medications. Pt stated full understanding of discharge instructions and followup. Discharged to home w/spouse.

## 2021-10-18 NOTE — PLAN OF CARE
PRIMARY DIAGNOSIS: CHEST PAIN  OUTPATIENT/OBSERVATION GOALS TO BE MET BEFORE DISCHARGE:  1. Ruled out acute coronary syndrome (negative or stable Troponin):  Yes  2. Pain Status: Pain free.  3. Appropriate provocative testing performed: Yes  - Stress Test Procedure: Echo, scheduled this AM  - Interpretation of cardiac rhythm per telemetry tech: SB/SR    4. Cleared by Consultants (if applicable):N/A  5. Return to near baseline physical activity: Yes  Discharge Planner Nurse   Safe discharge environment identified: Yes  Barriers to discharge: Yes, further testing required        Entered by: Saira Pearce 10/18/2021 3:51 AM     VSS on RA. Tele SB/SR. Pt A/O x 4, able to make needs known. Up independent in room. Denies pain, CP, SOB. On scheduled tylenol. PIV in left arm SL, flushed w/o difficulty. Trops negative x3. NPO for stress test this AM. Discharge TBD pending workup today. Will continue with plan of care.   Please review provider order for any additional goals.   Nurse to notify provider when observation goals have been met and patient is ready for discharge.